# Patient Record
Sex: FEMALE | Race: WHITE | NOT HISPANIC OR LATINO
[De-identification: names, ages, dates, MRNs, and addresses within clinical notes are randomized per-mention and may not be internally consistent; named-entity substitution may affect disease eponyms.]

---

## 2018-09-10 ENCOUNTER — NEW PATIENT (OUTPATIENT)
Age: 78
End: 2018-09-10

## 2018-09-10 DIAGNOSIS — H01.013: ICD-10-CM

## 2018-09-10 PROCEDURE — 99203 OFFICE O/P NEW LOW 30 MIN: CPT

## 2018-09-10 ASSESSMENT — VISUAL ACUITY
OS_CC: 20/20
OD_PH: 20/30+1
OD_CC: 20/50+2

## 2018-09-24 ENCOUNTER — CLINIC PROCEDURE ONLY (OUTPATIENT)
Age: 78
End: 2018-09-24

## 2018-09-24 DIAGNOSIS — D23.11: ICD-10-CM

## 2018-09-24 PROCEDURE — 67840 REMOVE EYELID LESION: CPT | Mod: E3

## 2019-05-29 ENCOUNTER — RX CHECK (OUTPATIENT)
Age: 79
End: 2019-05-29

## 2019-05-29 DIAGNOSIS — H52.03: ICD-10-CM

## 2019-05-29 DIAGNOSIS — H52.4: ICD-10-CM

## 2019-05-29 DIAGNOSIS — H52.203: ICD-10-CM

## 2019-05-29 PROCEDURE — 92015 DETERMINE REFRACTIVE STATE: CPT

## 2019-05-29 ASSESSMENT — VISUAL ACUITY
OD_CC: J5
OS_CC: 20/20-1
OS_CC: J3
OD_CC: 20/50+2

## 2020-11-23 ENCOUNTER — ESTABLISHED COMPREHENSIVE EXAM (OUTPATIENT)
Age: 80
End: 2020-11-23

## 2020-11-23 DIAGNOSIS — H35.372: ICD-10-CM

## 2020-11-23 DIAGNOSIS — H43.813: ICD-10-CM

## 2020-11-23 DIAGNOSIS — H01.01A: ICD-10-CM

## 2020-11-23 DIAGNOSIS — H25.813: ICD-10-CM

## 2020-11-23 DIAGNOSIS — H52.203: ICD-10-CM

## 2020-11-23 DIAGNOSIS — H52.4: ICD-10-CM

## 2020-11-23 DIAGNOSIS — H01.01B: ICD-10-CM

## 2020-11-23 DIAGNOSIS — H35.342: ICD-10-CM

## 2020-11-23 DIAGNOSIS — H52.03: ICD-10-CM

## 2020-11-23 PROCEDURE — 92134 CPTRZ OPH DX IMG PST SGM RTA: CPT

## 2020-11-23 PROCEDURE — 92083 EXTENDED VISUAL FIELD XM: CPT

## 2020-11-23 PROCEDURE — 92014 COMPRE OPH EXAM EST PT 1/>: CPT

## 2020-11-23 PROCEDURE — 92015 DETERMINE REFRACTIVE STATE: CPT | Mod: GY

## 2020-11-23 ASSESSMENT — VISUAL ACUITY
OS_CC: 20/30+2
OD_CC: 20/40-2

## 2020-11-23 ASSESSMENT — TONOMETRY
OD_IOP_MMHG: 14
OS_IOP_MMHG: 16

## 2021-06-22 ENCOUNTER — BRIEF - ESTABLISHED (OUTPATIENT)
Age: 81
End: 2021-06-22

## 2021-06-22 DIAGNOSIS — H43.813: ICD-10-CM

## 2021-06-22 DIAGNOSIS — H52.4: ICD-10-CM

## 2021-06-22 DIAGNOSIS — H52.03: ICD-10-CM

## 2021-06-22 DIAGNOSIS — H53.19: ICD-10-CM

## 2021-06-22 DIAGNOSIS — H52.203: ICD-10-CM

## 2021-06-22 DIAGNOSIS — H25.813: ICD-10-CM

## 2021-06-22 DIAGNOSIS — H35.372: ICD-10-CM

## 2021-06-22 DIAGNOSIS — H35.342: ICD-10-CM

## 2021-06-22 DIAGNOSIS — H01.01B: ICD-10-CM

## 2021-06-22 DIAGNOSIS — H01.01A: ICD-10-CM

## 2021-06-22 PROCEDURE — 99214 OFFICE O/P EST MOD 30 MIN: CPT

## 2021-06-22 ASSESSMENT — VISUAL ACUITY
OS_CC: 20/30-2
OD_CC: 20/50+2

## 2021-06-22 ASSESSMENT — TONOMETRY
OS_IOP_MMHG: 13
OD_IOP_MMHG: 15

## 2022-02-15 ENCOUNTER — ESTABLISHED COMPREHENSIVE EXAM (OUTPATIENT)
Age: 82
End: 2022-02-15

## 2022-02-15 DIAGNOSIS — H01.01A: ICD-10-CM

## 2022-02-15 DIAGNOSIS — H43.813: ICD-10-CM

## 2022-02-15 DIAGNOSIS — H01.01B: ICD-10-CM

## 2022-02-15 DIAGNOSIS — H35.342: ICD-10-CM

## 2022-02-15 DIAGNOSIS — H25.813: ICD-10-CM

## 2022-02-15 DIAGNOSIS — H35.372: ICD-10-CM

## 2022-02-15 PROCEDURE — 92134 CPTRZ OPH DX IMG PST SGM RTA: CPT

## 2022-02-15 PROCEDURE — 92014 COMPRE OPH EXAM EST PT 1/>: CPT

## 2022-02-15 ASSESSMENT — VISUAL ACUITY
OS_CC: 20/30-2
OD_PH: 20/30
OD_CC: 20/50+2

## 2022-02-15 ASSESSMENT — TONOMETRY
OS_IOP_MMHG: 21
OD_IOP_MMHG: 19

## 2023-01-25 ENCOUNTER — ESTABLISHED COMPREHENSIVE EXAM (OUTPATIENT)
Dept: URBAN - METROPOLITAN AREA CLINIC 45 | Facility: CLINIC | Age: 83
End: 2023-01-25

## 2023-01-25 DIAGNOSIS — H43.813: ICD-10-CM

## 2023-01-25 DIAGNOSIS — H25.813: ICD-10-CM

## 2023-01-25 DIAGNOSIS — H35.342: ICD-10-CM

## 2023-01-25 DIAGNOSIS — H01.01B: ICD-10-CM

## 2023-01-25 DIAGNOSIS — H35.372: ICD-10-CM

## 2023-01-25 DIAGNOSIS — H01.01A: ICD-10-CM

## 2023-01-25 PROCEDURE — 92134 CPTRZ OPH DX IMG PST SGM RTA: CPT

## 2023-01-25 PROCEDURE — 92014 COMPRE OPH EXAM EST PT 1/>: CPT

## 2023-01-25 ASSESSMENT — VISUAL ACUITY
OS_CC: 20/25+1
OD_CC: 20/30-1

## 2023-05-18 ENCOUNTER — CATARACT CONSULTATION (OUTPATIENT)
Dept: URBAN - METROPOLITAN AREA CLINIC 45 | Facility: CLINIC | Age: 83
End: 2023-05-18

## 2023-05-18 DIAGNOSIS — H01.01A: ICD-10-CM

## 2023-05-18 DIAGNOSIS — H01.01B: ICD-10-CM

## 2023-05-18 DIAGNOSIS — H25.813: ICD-10-CM

## 2023-05-18 DIAGNOSIS — H35.372: ICD-10-CM

## 2023-05-18 DIAGNOSIS — H43.813: ICD-10-CM

## 2023-05-18 DIAGNOSIS — H35.342: ICD-10-CM

## 2023-05-18 PROCEDURE — 99214 OFFICE O/P EST MOD 30 MIN: CPT

## 2023-05-18 PROCEDURE — 92025 CPTRIZED CORNEAL TOPOGRAPHY: CPT | Mod: NC

## 2023-05-18 ASSESSMENT — VISUAL ACUITY
OD_GLARE: 20/100
OS_GLARE: 20/100
OD_PH: 20/30
OU_CC: J1
OS_CC: 20/25+2
OD_CC: 20/60

## 2023-05-18 ASSESSMENT — KERATOMETRY
OD_AXISANGLE2_DEGREES: 39
OD_AXISANGLE_DEGREES: 129
OS_AXISANGLE_DEGREES: 4
OS_K1POWER_DIOPTERS: 43.25
OS_AXISANGLE2_DEGREES: 94
OD_K1POWER_DIOPTERS: 43.00
OD_K2POWER_DIOPTERS: 43.25
OS_K2POWER_DIOPTERS: 44.00

## 2023-06-20 ENCOUNTER — DIAGNOSTICS ONLY (OUTPATIENT)
Dept: URBAN - METROPOLITAN AREA CLINIC 45 | Facility: CLINIC | Age: 83
End: 2023-06-20

## 2023-06-20 DIAGNOSIS — H25.811: ICD-10-CM

## 2023-06-20 PROCEDURE — 92136 OPHTHALMIC BIOMETRY: CPT

## 2023-06-20 ASSESSMENT — KERATOMETRY
OS_K2POWER_DIOPTERS: 44.00
OD_K1POWER_DIOPTERS: 43.00
OD_AXISANGLE2_DEGREES: 39
OS_AXISANGLE2_DEGREES: 94
OD_K2POWER_DIOPTERS: 43.25
OS_AXISANGLE_DEGREES: 4
OD_AXISANGLE_DEGREES: 129
OS_K1POWER_DIOPTERS: 43.25

## 2023-07-18 ENCOUNTER — SURGERY/PROCEDURE (OUTPATIENT)
Dept: URBAN - METROPOLITAN AREA SURGICAL CENTER 15 | Facility: SURGICAL CENTER | Age: 83
End: 2023-07-18

## 2023-07-18 DIAGNOSIS — H52.221: ICD-10-CM

## 2023-07-18 DIAGNOSIS — H25.811: ICD-10-CM

## 2023-07-18 PROCEDURE — MISCLRI MISCLRI

## 2023-07-18 PROCEDURE — 66984 XCAPSL CTRC RMVL W/O ECP: CPT

## 2023-07-18 ASSESSMENT — KERATOMETRY
OD_K2POWER_DIOPTERS: 43.25
OD_K1POWER_DIOPTERS: 43.00
OD_AXISANGLE2_DEGREES: 39
OS_AXISANGLE_DEGREES: 4
OS_K2POWER_DIOPTERS: 44.00
OS_K1POWER_DIOPTERS: 43.25
OD_AXISANGLE_DEGREES: 129
OS_AXISANGLE2_DEGREES: 94

## 2023-07-19 ENCOUNTER — 1 DAY POST-OP (OUTPATIENT)
Dept: URBAN - METROPOLITAN AREA CLINIC 45 | Facility: CLINIC | Age: 83
End: 2023-07-19

## 2023-07-19 DIAGNOSIS — Z96.1: ICD-10-CM

## 2023-07-19 PROCEDURE — 99024 POSTOP FOLLOW-UP VISIT: CPT

## 2023-07-19 ASSESSMENT — TONOMETRY: OD_IOP_MMHG: 17

## 2023-07-19 ASSESSMENT — KERATOMETRY
OS_AXISANGLE_DEGREES: 4
OD_K2POWER_DIOPTERS: 43.25
OS_AXISANGLE2_DEGREES: 94
OD_AXISANGLE2_DEGREES: 39
OD_K1POWER_DIOPTERS: 43.00
OS_K2POWER_DIOPTERS: 44.00
OD_AXISANGLE_DEGREES: 129
OS_K1POWER_DIOPTERS: 43.25

## 2023-07-19 ASSESSMENT — VISUAL ACUITY: OD_SC: 20/40+2

## 2023-07-25 ENCOUNTER — 1 WEEK POST-OP (OUTPATIENT)
Dept: URBAN - METROPOLITAN AREA CLINIC 45 | Facility: CLINIC | Age: 83
End: 2023-07-25

## 2023-07-25 DIAGNOSIS — Z96.1: ICD-10-CM

## 2023-07-25 PROCEDURE — 99024 POSTOP FOLLOW-UP VISIT: CPT

## 2023-07-25 ASSESSMENT — KERATOMETRY
OS_K2POWER_DIOPTERS: 44.00
OD_AXISANGLE2_DEGREES: 39
OS_AXISANGLE2_DEGREES: 94
OS_AXISANGLE_DEGREES: 4
OD_AXISANGLE_DEGREES: 129
OD_K1POWER_DIOPTERS: 43.00
OS_K1POWER_DIOPTERS: 43.25
OD_K2POWER_DIOPTERS: 43.25

## 2023-07-25 ASSESSMENT — VISUAL ACUITY: OD_SC: 20/25

## 2023-08-17 ENCOUNTER — POST-OP CHECK (OUTPATIENT)
Dept: URBAN - METROPOLITAN AREA CLINIC 45 | Facility: CLINIC | Age: 83
End: 2023-08-17

## 2023-08-17 DIAGNOSIS — Z96.1: ICD-10-CM

## 2023-08-17 DIAGNOSIS — H25.811: ICD-10-CM

## 2023-08-17 PROCEDURE — 99024 POSTOP FOLLOW-UP VISIT: CPT

## 2023-08-17 ASSESSMENT — KERATOMETRY
OD_AXISANGLE_DEGREES: 129
OD_K1POWER_DIOPTERS: 43.00
OS_AXISANGLE2_DEGREES: 94
OS_K1POWER_DIOPTERS: 43.25
OD_AXISANGLE2_DEGREES: 39
OS_K2POWER_DIOPTERS: 44.00
OD_K2POWER_DIOPTERS: 43.25
OS_AXISANGLE_DEGREES: 4

## 2023-08-17 ASSESSMENT — VISUAL ACUITY: OD_SC: 20/20

## 2023-08-21 ENCOUNTER — APPOINTMENT (RX ONLY)
Dept: URBAN - METROPOLITAN AREA CLINIC 152 | Facility: CLINIC | Age: 83
Setting detail: DERMATOLOGY
End: 2023-08-21

## 2023-08-21 DIAGNOSIS — L30.4 ERYTHEMA INTERTRIGO: ICD-10-CM

## 2023-08-21 PROCEDURE — ? DIAGNOSIS COMMENT

## 2023-08-21 PROCEDURE — ? COUNSELING

## 2023-08-21 PROCEDURE — ? PRESCRIPTION

## 2023-08-21 PROCEDURE — 99204 OFFICE O/P NEW MOD 45 MIN: CPT

## 2023-08-21 RX ORDER — NYSTATIN CREAM 100000 [USP'U]/G
CREAM TOPICAL
Qty: 30 | Refills: 4 | Status: ERX | COMMUNITY
Start: 2023-08-21

## 2023-08-21 RX ADMIN — NYSTATIN CREAM: 100000 CREAM TOPICAL at 00:00

## 2023-08-21 ASSESSMENT — LOCATION DETAILED DESCRIPTION DERM: LOCATION DETAILED: MONS PUBIS

## 2023-08-21 ASSESSMENT — LOCATION SIMPLE DESCRIPTION DERM: LOCATION SIMPLE: GROIN

## 2023-08-21 ASSESSMENT — LOCATION ZONE DERM: LOCATION ZONE: VULVA

## 2023-08-21 NOTE — HPI: OTHER
Condition:: Rash
Please Describe Your Condition:: -rash in groin for 2 months\\n-Botox bladder has been helping with daytime incontinence \\n-Dipro cream helping still flaring up (Betamethasone dipropionate)

## 2023-08-21 NOTE — PROCEDURE: DIAGNOSIS COMMENT
Comment: Pt should apply to affected areas for twice a day for 2 weeks when not flared, then as needed\\nBarrier, over the counter cream when Intertrigo is not flared up
Render Risk Assessment In Note?: no
Detail Level: Simple

## 2023-08-21 NOTE — PROCEDURE: MIPS QUALITY
Quality 402: Tobacco Use And Help With Quitting Among Adolescents: Patient screened for tobacco and never smoked
Quality 431: Preventive Care And Screening: Unhealthy Alcohol Use - Screening: Patient not identified as an unhealthy alcohol user when screened for unhealthy alcohol use using a systematic screening method
Detail Level: Detailed
Quality 110: Preventive Care And Screening: Influenza Immunization: Influenza Immunization not Administered for Documented Reasons.
Quality 226: Preventive Care And Screening: Tobacco Use: Screening And Cessation Intervention: Patient screened for tobacco use and is an ex/non-smoker
Quality 111:Pneumonia Vaccination Status For Older Adults: Patient received any pneumococcal conjugate or polysaccharide vaccine on or after their 60th birthday and before the end of the measurement period

## 2025-06-18 ENCOUNTER — PROBLEM (OUTPATIENT)
Dept: URBAN - METROPOLITAN AREA CLINIC 45 | Facility: CLINIC | Age: 85
End: 2025-06-18

## 2025-06-18 DIAGNOSIS — H25.811: ICD-10-CM

## 2025-06-18 DIAGNOSIS — H01.114: ICD-10-CM

## 2025-06-18 PROCEDURE — 99213 OFFICE O/P EST LOW 20 MIN: CPT

## 2025-06-18 ASSESSMENT — VISUAL ACUITY
OS_CC: 20/20
OD_CC: 20/20

## (undated) RX ORDER — ERYTHROMYCIN 5 MG/G: 1/4 OINTMENT OPHTHALMIC TWICE A DAY

## (undated) RX ORDER — NEOMYCIN SULFATE, POLYMYXIN B SULFATE AND DEXAMETHASONE 3.5; 10000; 1 MG/G; [USP'U]/G; MG/G: 1/4 OINTMENT OPHTHALMIC